# Patient Record
Sex: MALE | Race: ASIAN | NOT HISPANIC OR LATINO | URBAN - METROPOLITAN AREA
[De-identification: names, ages, dates, MRNs, and addresses within clinical notes are randomized per-mention and may not be internally consistent; named-entity substitution may affect disease eponyms.]

---

## 2018-02-18 ENCOUNTER — EMERGENCY (EMERGENCY)
Facility: HOSPITAL | Age: 53
LOS: 0 days | Discharge: HOME | End: 2018-02-18
Attending: EMERGENCY MEDICINE
Payer: COMMERCIAL

## 2018-02-18 VITALS
TEMPERATURE: 98 F | HEIGHT: 68.5 IN | OXYGEN SATURATION: 97 % | DIASTOLIC BLOOD PRESSURE: 107 MMHG | WEIGHT: 190.04 LBS | HEART RATE: 84 BPM | SYSTOLIC BLOOD PRESSURE: 179 MMHG | RESPIRATION RATE: 17 BRPM

## 2018-02-18 VITALS — SYSTOLIC BLOOD PRESSURE: 169 MMHG | DIASTOLIC BLOOD PRESSURE: 106 MMHG | HEART RATE: 82 BPM

## 2018-02-18 DIAGNOSIS — H57.8 OTHER SPECIFIED DISORDERS OF EYE AND ADNEXA: ICD-10-CM

## 2018-02-18 DIAGNOSIS — I10 ESSENTIAL (PRIMARY) HYPERTENSION: ICD-10-CM

## 2018-02-18 DIAGNOSIS — H10.11 ACUTE ATOPIC CONJUNCTIVITIS, RIGHT EYE: ICD-10-CM

## 2018-02-18 DIAGNOSIS — E03.9 HYPOTHYROIDISM, UNSPECIFIED: ICD-10-CM

## 2018-02-18 PROCEDURE — 99283 EMERGENCY DEPT VISIT LOW MDM: CPT

## 2018-02-18 RX ORDER — LEVOTHYROXINE SODIUM 125 MCG
1 TABLET ORAL
Qty: 0 | Refills: 0 | COMMUNITY

## 2018-02-18 RX ORDER — METOPROLOL TARTRATE 50 MG
0 TABLET ORAL
Qty: 0 | Refills: 0 | COMMUNITY

## 2018-02-18 RX ORDER — OLOPATADINE HYDROCHLORIDE 1 MG/ML
1 SOLUTION/ DROPS OPHTHALMIC
Qty: 1 | Refills: 0 | OUTPATIENT
Start: 2018-02-18 | End: 2018-02-27

## 2018-02-18 NOTE — ED PROVIDER NOTE - EYES, MLM
Pupils equal, round and reactive to light, right conjunctiva mildly erythematous, + sclera swelling, 20/20 bilaterally with glasses

## 2018-02-18 NOTE — ED ADULT NURSE NOTE - OBJECTIVE STATEMENT
received pt sitting on the chair c/o redness to the eyes started about one hour PTA, +itching denies any trauma

## 2020-03-12 NOTE — ED ADULT TRIAGE NOTE - MEANS OF ARRIVAL
pt c/o cough, fever since Tuesday night. pt reports there was a confirmed case of COVID-19 at his workplace but unsure if he had any direct contact with the patient.
ambulatory